# Patient Record
Sex: FEMALE | Race: ASIAN | NOT HISPANIC OR LATINO
[De-identification: names, ages, dates, MRNs, and addresses within clinical notes are randomized per-mention and may not be internally consistent; named-entity substitution may affect disease eponyms.]

---

## 2022-04-14 PROBLEM — Z00.00 ENCOUNTER FOR PREVENTIVE HEALTH EXAMINATION: Status: ACTIVE | Noted: 2022-04-14

## 2022-04-19 ENCOUNTER — APPOINTMENT (OUTPATIENT)
Dept: ORTHOPEDIC SURGERY | Facility: CLINIC | Age: 47
End: 2022-04-19
Payer: COMMERCIAL

## 2022-04-19 VITALS — WEIGHT: 140 LBS | BODY MASS INDEX: 24.8 KG/M2 | HEIGHT: 63 IN

## 2022-04-19 DIAGNOSIS — Z78.9 OTHER SPECIFIED HEALTH STATUS: ICD-10-CM

## 2022-04-19 PROCEDURE — 99214 OFFICE O/P EST MOD 30 MIN: CPT

## 2022-04-19 PROCEDURE — 99072 ADDL SUPL MATRL&STAF TM PHE: CPT
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
## 2022-04-19 NOTE — IMAGING
[de-identified] : Neck: Palpation of the cervical spine is as follows: left trapezial tenderness. Strength Testing for the cervical spine is as follows: Left Deltoid strength 5/5, Right Deltoid strength 5/5, Left Biceps 5/5, Right Biceps 5/5, Left Triceps 5/5, Right Triceps 5/5, Left Wrist Flexors 5/5, Right Wrist Flexors 5/5, Left Finger Abductors 5/5, Right Finger Abductors 5/5, Left Grasp 5/5 and Right Grasp 5/5 Neurological testing for the cervical spine is as follows: positive Griffiths reflex on left and positive Griffiths reflex on right. light touch is intact throughout both upper extremities \par \par \par Back, including spine: Palpation of the thoracic/lumbar spine is as follows: right lumbar paraspinal tenderness and numbness/tingling right leg. No GT/ITB TTP Range of motion of the thoracic and lumbar spine is as follows: Diminished range of motion in all planes. Strength Testing of the thoracic and lumbar spine is as follows: Right ankle dosiflexors strength is 3/5. Right ankle plantor flexors strength is 4/5 Right extensor hallucis longus strength is 3/5. motor exam is non-focal throughout both lower extremities Special testing of the thoracic and lumbar spine is as follows: Positive sitting straight leg raise on the right.

## 2022-04-19 NOTE — ASSESSMENT
[FreeTextEntry1] : R SAZ HNP L5/S1 w/ R foraminal narrowing and Gr 1 listhesis\par C/w PT\par Pain consult to discuss LESI\par Discussed possible future surgical intervention for lumbar spine, discussed her partial footdrop and duration of symptoms\par C MRI straight, no sig. foraminal stenosis\par

## 2022-04-28 ENCOUNTER — APPOINTMENT (OUTPATIENT)
Dept: ORTHOPEDIC SURGERY | Facility: CLINIC | Age: 47
End: 2022-04-28
Payer: COMMERCIAL

## 2022-04-28 VITALS — BODY MASS INDEX: 24.8 KG/M2 | WEIGHT: 140 LBS | HEIGHT: 63 IN

## 2022-04-28 DIAGNOSIS — Z78.9 OTHER SPECIFIED HEALTH STATUS: ICD-10-CM

## 2022-04-28 PROCEDURE — 99024 POSTOP FOLLOW-UP VISIT: CPT

## 2022-04-28 NOTE — DISCUSSION/SUMMARY
[de-identified] : Case Discussed.\par Doing well. \par Continue therapy and hep.\par May return to work on 5/2/22 no restrictions. \par

## 2022-04-28 NOTE — RETURN TO WORK/SCHOOL
[Return Date: _____] : as of [unfilled].  This has been discussed in detail with ~Darek~ and ~he/she~ understands this.
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
## 2022-04-28 NOTE — PHYSICAL EXAM
[Left] : left shoulder [Sitting] : sitting [Mild] : mild [] : no erythema [TWNoteComboBox7] : active forward flexion 160 degrees [de-identified] : active abduction 140 degrees [TWNoteComboBox6] : internal rotation L4

## 2022-06-09 ENCOUNTER — APPOINTMENT (OUTPATIENT)
Dept: ORTHOPEDIC SURGERY | Facility: CLINIC | Age: 47
End: 2022-06-09
Payer: COMMERCIAL

## 2022-06-09 VITALS — WEIGHT: 140 LBS | BODY MASS INDEX: 24.8 KG/M2 | HEIGHT: 63 IN

## 2022-06-09 PROCEDURE — 99024 POSTOP FOLLOW-UP VISIT: CPT

## 2022-06-09 RX ORDER — MELOXICAM 15 MG/1
15 TABLET ORAL DAILY
Qty: 30 | Refills: 0 | Status: COMPLETED | COMMUNITY
Start: 2022-06-09 | End: 2022-07-09

## 2022-06-09 NOTE — DISCUSSION/SUMMARY
[de-identified] : Case Discussed.\par Doing well. \par Continue therapy and hep.\par Continue work light duty\par Recommend Mobic. \par Ice as needed. \par \par

## 2022-06-09 NOTE — PHYSICAL EXAM
[Left] : left shoulder [Sitting] : sitting [Mild] : mild [] : no erythema [TWNoteComboBox7] : active forward flexion 160 degrees [de-identified] : active abduction 140 degrees [TWNoteComboBox6] : internal rotation L4

## 2022-06-09 NOTE — RETURN TO WORK/SCHOOL
[Return Date: _____] : as of [unfilled].  This has been discussed in detail with ~Darek~ and ~he/she~ understands this. [Light Duty] : light duty
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
## 2022-06-23 ENCOUNTER — APPOINTMENT (OUTPATIENT)
Dept: PAIN MANAGEMENT | Facility: CLINIC | Age: 47
End: 2022-06-23

## 2022-06-23 VITALS — HEIGHT: 63 IN | BODY MASS INDEX: 24.8 KG/M2 | WEIGHT: 140 LBS

## 2022-06-23 PROCEDURE — 20552 NJX 1/MLT TRIGGER POINT 1/2: CPT

## 2022-06-23 PROCEDURE — 99072 ADDL SUPL MATRL&STAF TM PHE: CPT

## 2022-06-23 PROCEDURE — J3490M: CUSTOM

## 2022-06-23 PROCEDURE — 99214 OFFICE O/P EST MOD 30 MIN: CPT | Mod: 25

## 2022-06-23 NOTE — PROCEDURE
[Trigger point 1-2 muscle groups] : trigger point 1-2 muscle groups [Left] : of the left [Cervical paraspinal muscle] : cervical paraspinal muscle [Trapezius muscle] : trapezius muscle [Pain] : pain [Inflammation] : inflammation [Alcohol] : alcohol [Ethyl Chloride sprayed topically] : ethyl chloride sprayed topically [Sterile technique used] : sterile technique used [___ cc    1%] : Lidocaine ~Vcc of 1%  [___ cc    0.25%] : Bupivacaine (Marcaine) ~Vcc of 0.25%  [___ cc    10mg] : Triamcinolone (Kenalog) ~Vcc of 10 mg

## 2022-06-23 NOTE — PHYSICAL EXAM
[Normal Coordination] : normal coordination [Normal DTR UE/LE] : normal DTR UE/LE  [Normal Sensation] : normal sensation [Normal Mood and Affect] : normal mood and affect [Orientated] : orientated [Able to Communicate] : able to communicate [Normal Skin] : normal skin [No Rash] : no rash [No Ulcers] : no ulcers [No Lesions] : no lesions [No obvious lymphadenopathy in areas examined] : no obvious lymphadenopathy in areas examined [Well Developed] : well developed [Well Nourished] : well nourished [No Respiratory Distress] : no respiratory distress [Rotation to left] : rotation to left [Bending to right] : bending to right [] : mildly antalgic

## 2022-06-23 NOTE — DISCUSSION/SUMMARY
[Surgical risks reviewed] : Surgical risks reviewed [de-identified] : After discussing various treatment options with the patient including but not limited to oral medications, physical therapy, exercise,\par modalities as well as interventional spinal injections, we have decided with the following plan\par \par I personally reviewed the MRI/CT scan images and agree with the radiologist's report. The\par radiological findings were discussed with the patient.\par \par \par The risks, benefits, contents and alternatives to injection were explained in full to the patient. Risks outlined include but are not\par limited to infection,sepsis, bleeding, post-dural puncture headache, nerve damage, temporary increase in pain, syncopal episode,\par failure to resolve symptoms, allergic reaction, symptom recurrence, and elevation of blood sugar in diabetics. Cortisone may cause\par immunosuppression. Patient understands the risks. All questions were answered. After discussion of options, patient requested\par an injection. Information regarding the injection was given to the patient. Which medications to stop prior to the injection was\par explained to the patient as well.\par \par Follow up in 1-2 weeks post injection for re-evaluation.\par \par  Conservative Care\par Continue Home exercises, stretching, activity modification, physical therapy, and conservative care.\par \par \par Will proceed with R L3/4 L4/5 TFESI.
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
## 2022-06-23 NOTE — DATA REVIEWED
[MRI] : MRI [Lumbar Spine] : lumbar spine [Report was reviewed and noted in the chart] : The report was reviewed and noted in the chart [FreeTextEntry1] : 2021

## 2022-07-14 ENCOUNTER — APPOINTMENT (OUTPATIENT)
Dept: ORTHOPEDIC SURGERY | Facility: CLINIC | Age: 47
End: 2022-07-14

## 2022-07-14 VITALS — HEIGHT: 63 IN | WEIGHT: 140 LBS | BODY MASS INDEX: 24.8 KG/M2

## 2022-07-14 PROCEDURE — 99072 ADDL SUPL MATRL&STAF TM PHE: CPT

## 2022-07-14 PROCEDURE — 99213 OFFICE O/P EST LOW 20 MIN: CPT
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
## 2022-07-14 NOTE — PHYSICAL EXAM
[Left] : left shoulder [Sitting] : sitting [Mild] : mild [5 ___] : forward flexion 5[unfilled]/5 [5___] : internal rotation 5[unfilled]/5 [] : no erythema [TWNoteComboBox7] : active forward flexion 160 degrees [de-identified] : active abduction 150 degrees [TWNoteComboBox6] : internal rotation L4 [de-identified] : external rotation 65 degrees

## 2022-07-14 NOTE — DISCUSSION/SUMMARY
[de-identified] : General Dx Discussion\par The patient was advised of the diagnosis. The natural history of the pathology was explained in full to the patient in layman's terms. All questions were answered. The risks and benefits of surgical and non-surgical treatment alternatives were explained in full to the patient.\par \par Case Discussed.\par will cont PT f/u 6-8 weeks \par 
Report received from MARGUERITE Mra for change of shift.

## 2022-09-01 ENCOUNTER — APPOINTMENT (OUTPATIENT)
Dept: ORTHOPEDIC SURGERY | Facility: CLINIC | Age: 47
End: 2022-09-01

## 2022-09-01 VITALS — BODY MASS INDEX: 24.8 KG/M2 | HEIGHT: 63 IN | WEIGHT: 140 LBS

## 2022-09-01 PROCEDURE — 99213 OFFICE O/P EST LOW 20 MIN: CPT | Mod: 25

## 2022-09-01 PROCEDURE — 20611 DRAIN/INJ JOINT/BURSA W/US: CPT

## 2022-09-01 PROCEDURE — J3490M: CUSTOM

## 2022-09-01 PROCEDURE — 99072 ADDL SUPL MATRL&STAF TM PHE: CPT

## 2022-09-01 NOTE — PHYSICAL EXAM
[Left] : left shoulder [Sitting] : sitting [Mild] : mild [5 ___] : forward flexion 5[unfilled]/5 [5___] : internal rotation 5[unfilled]/5 [] : strength is improving [TWNoteComboBox7] : active forward flexion 160 degrees [de-identified] : active abduction 150 degrees [TWNoteComboBox6] : internal rotation 65 degrees [de-identified] : external rotation 70 degrees

## 2022-09-01 NOTE — HISTORY OF PRESENT ILLNESS
[9] : 9 [3] : 3 [Sharp] : sharp [] : yes [Intermittent] : intermittent [Household chores] : household chores [Leisure] : leisure [Sleep] : sleep [Lying in bed] : lying in bed [Full time] : Work status: full time [de-identified] : Patient is here for a NF follow u p, DOA 10/12/21 for her left shoulder. [FreeTextEntry1] : left shoulder [FreeTextEntry7] : to her bicep and sometimes the elbow [de-identified] : certain movements [de-identified] : Physical therapy 3 x a week HEP.

## 2022-09-01 NOTE — DISCUSSION/SUMMARY
[de-identified] : General Dx Discussion\par The patient was advised of the diagnosis. The natural history of the pathology was explained in full to the patient in layman's terms. All questions were answered. The risks and benefits of surgical and non-surgical treatment alternatives were explained in full to the patient.\par \par Case Discussed.\par will cont PT f/u 6-8 weeks \par will get a injection lt shoulder

## 2022-09-01 NOTE — PROCEDURE
[Large Joint Injection] : Large joint injection [Left] : of the left [Subacromial Space] : subacromial space [Pain] : pain [Inflammation] : inflammation [X-ray evidence of Osteoarthritis on this or prior visit] : x-ray evidence of Osteoarthritis on this or prior visit [Alcohol] : alcohol [Betadine] : betadine [Ethyl Chloride sprayed topically] : ethyl chloride sprayed topically [Sterile technique used] : sterile technique used [___ cc    3mg] :  Betamethasone (Celestone) ~Vcc of 3mg [___ cc    1%] : Lidocaine ~Vcc of 1%  [___ cc    0.25%] : Bupivacaine (Marcaine) ~Vcc of 0.25%  [] : Patient tolerated procedure well [Call if redness, pain or fever occur] : call if redness, pain or fever occur [Apply ice for 15min out of every hour for the next 12-24 hours as tolerated] : apply ice for 15 minutes out of every hour for the next 12-24 hours as tolerated [Previous OTC use and PT nontherapeutic] : patient has tried OTC's including aspirin, Ibuprofen, Aleve, etc or prescription NSAIDS, and/or exercises at home and/or physical therapy without satisfactory response [Patient had decreased mobility in the joint] : patient had decreased mobility in the joint [Risks, benefits, alternatives discussed / Verbal consent obtained] : the risks benefits, and alternatives have been discussed, and verbal consent was obtained [Prior failure or difficult injection] : prior failure or difficult injection [All ultrasound images have been permanently captured and stored accordingly in our picture archiving and communication system] : All ultrasound images have been permanently captured and stored accordingly in our picture archiving and communication system [Visualization of the needle and placement of injection was performed without complication] : visualization of the needle and placement of injection was performed without complication

## 2022-09-15 ENCOUNTER — APPOINTMENT (OUTPATIENT)
Dept: PAIN MANAGEMENT | Facility: CLINIC | Age: 47
End: 2022-09-15

## 2022-09-15 VITALS — BODY MASS INDEX: 24.8 KG/M2 | HEIGHT: 63 IN | WEIGHT: 140 LBS

## 2022-09-15 PROCEDURE — 99072 ADDL SUPL MATRL&STAF TM PHE: CPT

## 2022-09-15 PROCEDURE — 99214 OFFICE O/P EST MOD 30 MIN: CPT | Mod: 25

## 2022-09-15 PROCEDURE — 20552 NJX 1/MLT TRIGGER POINT 1/2: CPT

## 2022-09-15 PROCEDURE — J3490M: CUSTOM

## 2022-09-15 NOTE — PROCEDURE
leg pain [Trigger point 1-2 muscle groups] : trigger point 1-2 muscle groups [Bilateral] : bilaterally of the [Lumbar paraspinal muscle] : lumbar paraspinal muscle [Gluteus Luis Felipe muscle] : gluteus luis felipe muscle [Pain] : pain [Inflammation] : inflammation [Alcohol] : alcohol [Ethyl Chloride sprayed topically] : ethyl chloride sprayed topically [Sterile technique used] : sterile technique used [___ cc    1%] : Lidocaine ~Vcc of 1%  [___ cc    0.25%] : Bupivacaine (Marcaine) ~Vcc of 0.25%  [___ cc    10mg] : Triamcinolone (Kenalog) ~Vcc of 10 mg  [] : Patient tolerated procedure well

## 2022-09-15 NOTE — DISCUSSION/SUMMARY
[Surgical risks reviewed] : Surgical risks reviewed [de-identified] : After discussing various treatment options with the patient including but not limited to oral medications, physical therapy, exercise,\par modalities as well as interventional spinal injections, we have decided with the following plan\par \par I personally reviewed the MRI/CT scan images and agree with the radiologist's report. The\par radiological findings were discussed with the patient.\par \par \par The risks, benefits, contents and alternatives to injection were explained in full to the patient. Risks outlined include but are not\par limited to infection,sepsis, bleeding, post-dural puncture headache, nerve damage, temporary increase in pain, syncopal episode,\par failure to resolve symptoms, allergic reaction, symptom recurrence, and elevation of blood sugar in diabetics. Cortisone may cause\par immunosuppression. Patient understands the risks. All questions were answered. After discussion of options, patient requested\par an injection. Information regarding the injection was given to the patient. Which medications to stop prior to the injection was\par explained to the patient as well.\par \par Follow up in 1-2 weeks post injection for re-evaluation.\par \par  Conservative Care\par Continue Home exercises, stretching, activity modification, physical therapy, and conservative care.\par
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
## 2022-09-15 NOTE — ASSESSMENT
[FreeTextEntry1] : Pt notes significant relief >70% or more from TPI to L shoulder and neck, she then decdied to defer epidural and try TPI in her lower back in an attempt to avoid epidural if possible. Will offer TPI ot lumbar region tonight.

## 2022-10-18 ENCOUNTER — APPOINTMENT (OUTPATIENT)
Dept: ORTHOPEDIC SURGERY | Facility: CLINIC | Age: 47
End: 2022-10-18

## 2022-10-18 PROCEDURE — 99072 ADDL SUPL MATRL&STAF TM PHE: CPT | Mod: ACP

## 2022-10-18 PROCEDURE — 99214 OFFICE O/P EST MOD 30 MIN: CPT | Mod: ACP

## 2022-10-18 NOTE — ASSESSMENT
[FreeTextEntry1] : R SAZ HNP L5/S1 w/ R foraminal narrowing and Gr 1 listhesis\par C/w PT\par Pain consult to discuss LESI\par Discussed possible future surgical intervention for lumbar spine, discussed her partial footdrop and duration of symptoms\par C MRI straight, no sig. foraminal stenosis\par Gabapentin\par  Patient advised of sedating effects, instructed not to drive, operate machinery, or take with other sedating medications. Advised of need to taper on/off medication and risk of abruptly stopping gabapentin.\par

## 2022-10-18 NOTE — IMAGING
[de-identified] : Neck: Palpation of the cervical spine is as follows: bilateral trapezial tenderness. Strength Testing for the cervical spine is as follows: Left Deltoid strength 5/5, Right Deltoid strength 5/5, Left Biceps 5/5, Right Biceps 5/5, Left Triceps 5/5, Right Triceps 5/5, Left Wrist Flexors 5/5, Right Wrist Flexors 5/5, Left Finger Abductors 5/5, Right Finger Abductors 5/5, Left Grasp 5/5 and Right Grasp 5/5 Neurological testing for the cervical spine is as follows: positive Griffiths reflex on left and positive Griffiths reflex on right. light touch is intact throughout both upper extremities \par \par \par Back, including spine: Palpation of the thoracic/lumbar spine is as follows: right lumbar paraspinal tenderness and numbness/tingling right leg. No GT/ITB TTP Range of motion of the thoracic and lumbar spine is as follows: Diminished range of motion in all planes. Strength Testing of the thoracic and lumbar spine is as follows: Right ankle dosiflexors strength is 3/5. Right ankle plantor flexors strength is 4/5 Right extensor hallucis longus strength is 3/5. motor exam is non-focal throughout both lower extremities Special testing of the thoracic and lumbar spine is as follows: Positive sitting straight leg raise on the right.
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
## 2022-10-20 ENCOUNTER — APPOINTMENT (OUTPATIENT)
Dept: ORTHOPEDIC SURGERY | Facility: CLINIC | Age: 47
End: 2022-10-20

## 2022-10-20 VITALS — BODY MASS INDEX: 24.8 KG/M2 | WEIGHT: 140 LBS | HEIGHT: 63 IN

## 2022-10-20 DIAGNOSIS — Z98.890 OTHER SPECIFIED POSTPROCEDURAL STATES: ICD-10-CM

## 2022-10-20 PROCEDURE — 99072 ADDL SUPL MATRL&STAF TM PHE: CPT

## 2022-10-20 PROCEDURE — 99213 OFFICE O/P EST LOW 20 MIN: CPT

## 2022-10-20 NOTE — DISCUSSION/SUMMARY
[de-identified] : General Dx Discussion\par The patient was advised of the diagnosis. The natural history of the pathology was explained in full to the patient in layman's terms. All questions were answered. The risks and benefits of surgical and non-surgical treatment alternatives were explained in full to the patient.\par \par Will cont PT
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
## 2022-10-20 NOTE — PHYSICAL EXAM
[Left] : left shoulder [Sitting] : sitting [Mild] : mild [5 ___] : forward flexion 5[unfilled]/5 [5___] : internal rotation 5[unfilled]/5 [] : strength is improving [TWNoteComboBox7] : active forward flexion 170 degrees [TWNoteComboBox6] : internal rotation 70 degrees [de-identified] : active abduction 160 degrees [de-identified] : external rotation 80 degrees

## 2022-11-29 ENCOUNTER — APPOINTMENT (OUTPATIENT)
Dept: ORTHOPEDIC SURGERY | Facility: CLINIC | Age: 47
End: 2022-11-29

## 2022-11-29 PROCEDURE — 99213 OFFICE O/P EST LOW 20 MIN: CPT

## 2022-11-29 PROCEDURE — 99072 ADDL SUPL MATRL&STAF TM PHE: CPT
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
## 2022-11-29 NOTE — ASSESSMENT
[FreeTextEntry1] : R SAZ HNP L5/S1 w/ R foraminal narrowing and Gr 1 listhesis\par C/w PT, acupuncture\par Pain consult to discuss LESI\par Discussed possible future surgical intervention for lumbar spine, discussed her partial footdrop and duration of symptoms\par C MRI straight, no sig. foraminal stenosis\par Gabapentin\par  Patient advised of sedating effects, instructed not to drive, operate machinery, or take with other sedating medications. Advised of need to taper on/off medication and risk of abruptly stopping gabapentin.\par \par Seen by Selene Najera PA-C, under the supervision of Dr. Joe Maradiaga M.D.

## 2022-11-29 NOTE — IMAGING
2015    sigmoid diverticulosis    HAMMER TOE SURGERY Left     2ND  AND 3RD TOE DR ROLLING [facility] 12    LIVER BIOPSY      TYMPANIC MEMBRANE REPAIR      MULTIPLE AS A CHILD       Social History     Socioeconomic History    Marital status:      Spouse name: None    Number of children: None    Years of education: None    Highest education level: None   Occupational History    None   Social Needs    Financial resource strain: None    Food insecurity:     Worry: None     Inability: None    Transportation needs:     Medical: None     Non-medical: None   Tobacco Use    Smoking status: Former Smoker     Packs/day: 1.50     Years: 9.00     Pack years: 13.50     Types: Cigarettes     Last attempt to quit: 1992     Years since quittin.9    Smokeless tobacco: Former User     Quit date: 1990    Tobacco comment: Bayron Natarajan RRT 19   Substance and Sexual Activity    Alcohol use: No     Alcohol/week: 0.0 standard drinks     Comment: HISTORY OF ALCOHOL ABUSE 30 YEARS AGO quit 1989    Drug use: No     Types: IV     Comment: HISTORY OF IV DRUG USE 30 YEARS AGO    Sexual activity: None   Lifestyle    Physical activity:     Days per week: None     Minutes per session: None    Stress: None   Relationships    Social connections:     Talks on phone: None     Gets together: None     Attends Islam service: None     Active member of club or organization: None     Attends meetings of clubs or organizations: None     Relationship status: None    Intimate partner violence:     Fear of current or ex partner: None     Emotionally abused: None     Physically abused: None     Forced sexual activity: None   Other Topics Concern    None   Social History Narrative    None       Family History   Problem Relation Age of Onset    High Blood Pressure Mother     Diabetes Mother     Cataracts Mother     Cancer Father     Stroke Father     Other Brother         HEPATITIS C    Diabetes Sister     Diabetes Psychiatric: His speech is normal. Thought content normal. His mood appears anxious (mildly). Cognition and memory are normal.   Nursing note and vitals reviewed. ASSESSMENT/PLAN:  1. Acute saddle pulmonary embolism without acute cor pulmonale (HCC)  Will have patient continue xarelto. He is to be off work until see by hematology  - Reina Farooq MD, Hematology/Oncology, [place]      2. Multiple joint pain  Labs ordered  - Rheumatoid Factor; Future  - RICCARDO Screen With Reflex; Future  - C-Reactive Protein; Future  - CBC Auto Differential; Future    3. Depression   Will add celexa 10mg daily     4. Anxiety  Will add celexa 10mg daily   Discussed patient to consider counseling again   Pt verbalizes understanding    Return in about 4 weeks (around 8/27/2019), or if symptoms worsen or fail to improve. An electronic signature was used to authenticate this note.     --SHAKIRA Bernal - CNP on 8/1/2019 at 3:21 PM [de-identified] : Neck: Palpation of the cervical spine is as follows: bilateral trapezial tenderness/rhomboid tenderness, L>R. Strength Testing for the cervical spine is as follows: Left Deltoid strength 5/5, Right Deltoid strength 5/5, Left Biceps 5/5, Right Biceps 5/5, Left Triceps 5/5, Right Triceps 5/5, Left Wrist Flexors 5/5, Right Wrist Flexors 5/5, Left Finger Abductors 5/5, Right Finger Abductors 5/5, Left Grasp 5/5 and Right Grasp 5/5 Neurological testing for the cervical spine is as follows: positive Griffiths reflex on left and positive Griffiths reflex on right. light touch is intact throughout both upper extremities \par \par \par Back, including spine: Palpation of the thoracic/lumbar spine is as follows: bilateral  lumbar paraspinal tenderness and numbness/tingling right leg. No GT/ITB TTP Range of motion of the thoracic and lumbar spine is as follows: Diminished range of motion in all planes. Strength Testing of the thoracic and lumbar spine is as follows: Right ankle dosiflexors strength is 3/5. Right ankle plantor flexors strength is 4/5 Right extensor hallucis longus strength is 3/5. motor exam is non-focal throughout both lower extremities Special testing of the thoracic and lumbar spine is as follows: Positive sitting straight leg raise bilaterally and tight hamstrings bilaterally.

## 2023-01-05 ENCOUNTER — APPOINTMENT (OUTPATIENT)
Dept: ORTHOPEDIC SURGERY | Facility: CLINIC | Age: 48
End: 2023-01-05
Payer: COMMERCIAL

## 2023-01-05 VITALS — HEIGHT: 63 IN | WEIGHT: 140 LBS | BODY MASS INDEX: 24.8 KG/M2

## 2023-01-05 PROCEDURE — 99072 ADDL SUPL MATRL&STAF TM PHE: CPT

## 2023-01-05 PROCEDURE — 99213 OFFICE O/P EST LOW 20 MIN: CPT

## 2023-01-05 NOTE — DISCUSSION/SUMMARY
[de-identified] : General Dx Discussion\par The patient was advised of the diagnosis. The natural history of the pathology was explained in full to the patient in layman's terms. All questions were answered. The risks and benefits of surgical and non-surgical treatment alternatives were explained in full to the patient.\par \par Case Discussed.\par will cont PT f/u 6-8 weeks \par
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
## 2023-01-05 NOTE — PHYSICAL EXAM
[Left] : left shoulder [Sitting] : sitting [Mild] : mild [5 ___] : forward flexion 5[unfilled]/5 [5___] : internal rotation 5[unfilled]/5 [] : strength is improving [TWNoteComboBox7] : active forward flexion 160 degrees [de-identified] : active abduction 150 degrees [TWNoteComboBox6] : internal rotation 65 degrees [de-identified] : external rotation 70 degrees

## 2023-01-17 ENCOUNTER — APPOINTMENT (OUTPATIENT)
Dept: ORTHOPEDIC SURGERY | Facility: CLINIC | Age: 48
End: 2023-01-17

## 2023-02-20 ENCOUNTER — RX RENEWAL (OUTPATIENT)
Age: 48
End: 2023-02-20

## 2023-03-10 ENCOUNTER — APPOINTMENT (OUTPATIENT)
Dept: ORTHOPEDIC SURGERY | Facility: CLINIC | Age: 48
End: 2023-03-10
Payer: COMMERCIAL

## 2023-03-10 VITALS — HEIGHT: 63 IN | WEIGHT: 140 LBS | BODY MASS INDEX: 24.8 KG/M2

## 2023-03-10 PROCEDURE — 99072 ADDL SUPL MATRL&STAF TM PHE: CPT

## 2023-03-10 PROCEDURE — 99213 OFFICE O/P EST LOW 20 MIN: CPT
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
## 2023-03-10 NOTE — IMAGING
[de-identified] : Neck: Palpation of the cervical spine is as follows: bilateral trapezial tenderness/rhomboid tenderness, L>R. Strength Testing for the cervical spine is as follows: Left Deltoid strength 5/5, Right Deltoid strength 5/5, Left Biceps 5/5, Right Biceps 5/5, Left Triceps 5/5, Right Triceps 5/5, Left Wrist Flexors 5/5, Right Wrist Flexors 5/5, Left Finger Abductors 5/5, Right Finger Abductors 5/5, Left Grasp 5/5 and Right Grasp 5/5 Neurological testing for the cervical spine is as follows: positive Griffiths reflex on left and positive Griffiths reflex on right. light touch is intact throughout both upper extremities \par \par \par Back, including spine: Palpation of the thoracic/lumbar spine is as follows: bilateral  lumbar paraspinal tenderness and numbness/tingling right leg. No GT/ITB TTP Range of motion of the thoracic and lumbar spine is as follows: Diminished range of motion in all planes. Strength Testing of the thoracic and lumbar spine is as follows: Right ankle dosiflexors strength is 5/5. Right ankle plantor flexors strength is 4/5 Right extensor hallucis longus strength is 3/5. motor exam is non-focal throughout both lower extremities Special testing of the thoracic and lumbar spine is as follows: Positive sitting straight leg raise on the right and tight hamstrings bilaterally.

## 2023-03-10 NOTE — ASSESSMENT
[FreeTextEntry1] : R SAZ HNP L5/S1 w/ R foraminal narrowing and Gr 1 listhesis\par C/w PT, acupuncture\par Pain f/u PRN\par Discussed possible future surgical intervention for lumbar spine, discussed her partial footdrop and duration of symptoms\par C MRI straight, no sig. foraminal stenosis\par Gabapentin\par  Patient advised of sedating effects, instructed not to drive, operate machinery, or take with other sedating medications. Advised of need to taper on/off medication and risk of abruptly stopping gabapentin.\par \par Seen by Selene Najera PA-C, under the supervision of Dr. Joe Maradiaga M.D.

## 2023-03-23 ENCOUNTER — APPOINTMENT (OUTPATIENT)
Dept: ORTHOPEDIC SURGERY | Facility: CLINIC | Age: 48
End: 2023-03-23
Payer: COMMERCIAL

## 2023-03-23 VITALS — WEIGHT: 140 LBS | BODY MASS INDEX: 24.8 KG/M2 | HEIGHT: 63 IN

## 2023-03-23 DIAGNOSIS — M75.42 IMPINGEMENT SYNDROME OF LEFT SHOULDER: ICD-10-CM

## 2023-03-23 DIAGNOSIS — M75.52 BURSITIS OF LEFT SHOULDER: ICD-10-CM

## 2023-03-23 PROCEDURE — 99213 OFFICE O/P EST LOW 20 MIN: CPT

## 2023-03-23 NOTE — DISCUSSION/SUMMARY
[de-identified] : General Dx discussion\par The patient was advised of the diagnosis. The natural history of the pathology was explained in full to the patient in layman's terms. All questions were answered. The risks and benefits of surgical and non-surgical treatment alternatives were explained in full to the patient. \par \par Case discussed.\par Continue PT and wean to a HEP.\par Followup as needed.\par \par Entered by SUPA Cruz acting as scribe.\par -\par The documentation recorded by the scribe accurately reflects the service I personally performed and the decisions made by me.\par

## 2023-03-23 NOTE — REASON FOR VISIT
[FreeTextEntry2] : NF follow up-left shoulder. She has been going to PT and doing a HEP. She states pain has improved.
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
## 2023-03-23 NOTE — PHYSICAL EXAM
[Left] : left shoulder [Sitting] : sitting [Mild] : mild [5 ___] : forward flexion 5[unfilled]/5 [5___] : internal rotation 5[unfilled]/5 [] : no erythema [TWNoteComboBox7] : active forward flexion 160 degrees [TWNoteComboBox6] : internal rotation 65 degrees [de-identified] : active abduction 150 degrees [de-identified] : external rotation 70 degrees

## 2023-05-09 ENCOUNTER — APPOINTMENT (OUTPATIENT)
Dept: ORTHOPEDIC SURGERY | Facility: CLINIC | Age: 48
End: 2023-05-09

## 2023-06-13 ENCOUNTER — APPOINTMENT (OUTPATIENT)
Dept: ORTHOPEDIC SURGERY | Facility: CLINIC | Age: 48
End: 2023-06-13
Payer: COMMERCIAL

## 2023-06-13 PROCEDURE — 99214 OFFICE O/P EST MOD 30 MIN: CPT

## 2023-06-13 NOTE — ASSESSMENT
[FreeTextEntry1] : R SAZ HNP L5/S1 w/ R foraminal narrowing and Gr 1 listhesis\par C/w PT, acupuncture\par Pain c/x \par Discussed possible future surgical intervention for lumbar spine, discussed her partial footdrop and duration of symptoms\par C MRI straight, no sig. foraminal stenosis\par Gabapentin\par Patient advised of sedating effects, instructed not to drive, operate machinery, or take with other sedating medications. Advised of need to taper on/off medication and risk of abruptly stopping gabapentin..

## 2023-06-13 NOTE — IMAGING
[de-identified] : Neck: Palpation of the cervical spine is as follows: bilateral trapezial tenderness/rhomboid tenderness, L>R. Strength Testing for the cervical spine is as follows: Left Deltoid strength 5/5, Right Deltoid strength 5/5, Left Biceps 5/5, Right Biceps 5/5, Left Triceps 5/5, Right Triceps 5/5, Left Wrist Flexors 5/5, Right Wrist Flexors 5/5, Left Finger Abductors 5/5, Right Finger Abductors 5/5, Left Grasp 5/5 and Right Grasp 5/5 Neurological testing for the cervical spine is as follows: positive Griffiths reflex on left and positive Griffiths reflex on right. light touch is intact throughout both upper extremities \par \par \par Back, including spine: Palpation of the thoracic/lumbar spine is as follows: bilateral  lumbar paraspinal tenderness and numbness/tingling right leg. No GT/ITB TTP \par Range of motion of the thoracic and lumbar spine is as follows: Diminished range of motion in all planes. \par Strength Testing of the thoracic and lumbar spine is as follows: Right ankle dosiflexors strength is 5/5. Right ankle plantor flexors strength is 4/5 Right extensor hallucis longus strength is 3/5. \par Special testing of the thoracic and lumbar spine is as follows: Positive sitting straight leg raise on the right and tight hamstrings bilaterally.
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
## 2023-11-16 ENCOUNTER — APPOINTMENT (OUTPATIENT)
Dept: PAIN MANAGEMENT | Facility: CLINIC | Age: 48
End: 2023-11-16
Payer: COMMERCIAL

## 2023-11-16 VITALS — WEIGHT: 149 LBS | BODY MASS INDEX: 26.4 KG/M2 | HEIGHT: 63 IN

## 2023-11-16 PROCEDURE — 99214 OFFICE O/P EST MOD 30 MIN: CPT

## 2024-01-31 ENCOUNTER — APPOINTMENT (OUTPATIENT)
Dept: ORTHOPEDIC SURGERY | Facility: CLINIC | Age: 49
End: 2024-01-31
Payer: COMMERCIAL

## 2024-01-31 ENCOUNTER — APPOINTMENT (OUTPATIENT)
Dept: ORTHOPEDIC SURGERY | Facility: CLINIC | Age: 49
End: 2024-01-31

## 2024-01-31 DIAGNOSIS — M62.838 OTHER MUSCLE SPASM: ICD-10-CM

## 2024-01-31 DIAGNOSIS — M43.17 SPONDYLOLISTHESIS, LUMBOSACRAL REGION: ICD-10-CM

## 2024-01-31 DIAGNOSIS — M54.16 RADICULOPATHY, LUMBAR REGION: ICD-10-CM

## 2024-01-31 DIAGNOSIS — M51.26 OTHER INTERVERTEBRAL DISC DISPLACEMENT, LUMBAR REGION: ICD-10-CM

## 2024-01-31 PROCEDURE — 99214 OFFICE O/P EST MOD 30 MIN: CPT

## 2024-01-31 RX ORDER — GABAPENTIN 300 MG/1
300 CAPSULE ORAL
Qty: 30 | Refills: 2 | Status: ACTIVE | COMMUNITY
Start: 2022-10-18 | End: 1900-01-01

## 2024-01-31 NOTE — ASSESSMENT
[FreeTextEntry1] : R SAZ HNP L5/S1 w/ R foraminal narrowing and Gr 1 listhesis C/w PT, acupuncture Pain f/u Discussed possible future surgical intervention for lumbar spine, discussed her partial footdrop and duration of symptoms C MRI straight, no sig. foraminal stenosis Gabapentin Patient advised of sedating effects, instructed not to drive, operate machinery, or take with other sedating medications. Advised of need to taper on/off medication and risk of abruptly stopping gabapentin..  f/u 2 months with Dr. Maradiaga

## 2024-01-31 NOTE — IMAGING
Type of surgery: RIGHT FOURTH FINGER MASS EXCISION (Right)   Location of surgery: [facility]  Date and time of surgery: 09/27/2023  Surgeon: JUAN CARLOS  Pre-Op Appt Date: NONE local  Post-Op Appt Date: 10/10/2023   Packet sent out: Yes  Pre-cert/Authorization completed:  No  Date:     [de-identified] : Neck: Palpation of the cervical spine is as follows: bilateral trapezial tenderness/rhomboid tenderness, L>R. Strength Testing for the cervical spine is as follows: Left Deltoid strength 5/5, Right Deltoid strength 5/5, Left Biceps 5/5, Right Biceps 5/5, Left Triceps 5/5, Right Triceps 5/5, Left Wrist Flexors 5/5, Right Wrist Flexors 5/5, Left Finger Abductors 5/5, Right Finger Abductors 5/5, Left Grasp 5/5 and Right Grasp 5/5 Neurological testing for the cervical spine is as follows: positive Griffiths reflex on left and positive Griffiths reflex on right. light touch is intact throughout both upper extremities    Back, including spine: Palpation of the thoracic/lumbar spine is as follows: bilateral  lumbar paraspinal tenderness and numbness/tingling right leg. No GT/ITB TTP  Range of motion of the thoracic and lumbar spine is as follows: Diminished range of motion in all planes.  Strength Testing of the thoracic and lumbar spine is as follows: Right ankle dosiflexors strength is 5/5. Right ankle plantor flexors strength is 4/5 Right extensor hallucis longus strength is 3/5.  Special testing of the thoracic and lumbar spine is as follows: Positive sitting straight leg raise on the right>left and tight hamstrings bilaterally.
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
## 2024-04-03 ENCOUNTER — APPOINTMENT (OUTPATIENT)
Dept: ORTHOPEDIC SURGERY | Facility: CLINIC | Age: 49
End: 2024-04-03